# Patient Record
Sex: FEMALE | Race: WHITE | NOT HISPANIC OR LATINO
[De-identification: names, ages, dates, MRNs, and addresses within clinical notes are randomized per-mention and may not be internally consistent; named-entity substitution may affect disease eponyms.]

---

## 2021-10-18 PROBLEM — Z00.00 ENCOUNTER FOR PREVENTIVE HEALTH EXAMINATION: Status: ACTIVE | Noted: 2021-10-18

## 2021-11-18 ENCOUNTER — ASOB RESULT (OUTPATIENT)
Age: 26
End: 2021-11-18

## 2021-11-18 ENCOUNTER — APPOINTMENT (OUTPATIENT)
Dept: ANTEPARTUM | Facility: CLINIC | Age: 26
End: 2021-11-18
Payer: COMMERCIAL

## 2021-11-18 PROCEDURE — 99072 ADDL SUPL MATRL&STAF TM PHE: CPT

## 2021-11-18 PROCEDURE — 76813 OB US NUCHAL MEAS 1 GEST: CPT | Mod: 59

## 2021-11-18 PROCEDURE — 76801 OB US < 14 WKS SINGLE FETUS: CPT

## 2022-01-24 ENCOUNTER — APPOINTMENT (OUTPATIENT)
Dept: ANTEPARTUM | Facility: CLINIC | Age: 27
End: 2022-01-24
Payer: COMMERCIAL

## 2022-01-24 ENCOUNTER — ASOB RESULT (OUTPATIENT)
Age: 27
End: 2022-01-24

## 2022-01-24 PROCEDURE — 76811 OB US DETAILED SNGL FETUS: CPT

## 2022-01-24 PROCEDURE — 99072 ADDL SUPL MATRL&STAF TM PHE: CPT

## 2022-06-09 VITALS
SYSTOLIC BLOOD PRESSURE: 129 MMHG | WEIGHT: 187.4 LBS | HEIGHT: 66 IN | DIASTOLIC BLOOD PRESSURE: 78 MMHG | BODY MASS INDEX: 30.12 KG/M2

## 2022-06-13 ENCOUNTER — INPATIENT (INPATIENT)
Facility: HOSPITAL | Age: 27
LOS: 2 days | Discharge: HOME | End: 2022-06-16
Attending: OBSTETRICS & GYNECOLOGY | Admitting: OBSTETRICS & GYNECOLOGY
Payer: COMMERCIAL

## 2022-06-13 VITALS
HEART RATE: 89 BPM | TEMPERATURE: 99 F | SYSTOLIC BLOOD PRESSURE: 129 MMHG | DIASTOLIC BLOOD PRESSURE: 77 MMHG | RESPIRATION RATE: 16 BRPM

## 2022-06-13 LAB
AMPHET UR-MCNC: NEGATIVE — SIGNIFICANT CHANGE UP
APPEARANCE UR: CLEAR — SIGNIFICANT CHANGE UP
BACTERIA # UR AUTO: NEGATIVE — SIGNIFICANT CHANGE UP
BARBITURATES UR SCN-MCNC: NEGATIVE — SIGNIFICANT CHANGE UP
BASOPHILS # BLD AUTO: 0.02 K/UL — SIGNIFICANT CHANGE UP (ref 0–0.2)
BASOPHILS NFR BLD AUTO: 0.2 % — SIGNIFICANT CHANGE UP (ref 0–1)
BENZODIAZ UR-MCNC: NEGATIVE — SIGNIFICANT CHANGE UP
BILIRUB UR-MCNC: NEGATIVE — SIGNIFICANT CHANGE UP
BLD GP AB SCN SERPL QL: SIGNIFICANT CHANGE UP
BUPRENORPHINE SCREEN, URINE RESULT: NEGATIVE — SIGNIFICANT CHANGE UP
COCAINE METAB.OTHER UR-MCNC: NEGATIVE — SIGNIFICANT CHANGE UP
COLOR SPEC: COLORLESS — SIGNIFICANT CHANGE UP
DIFF PNL FLD: ABNORMAL
EOSINOPHIL # BLD AUTO: 0.01 K/UL — SIGNIFICANT CHANGE UP (ref 0–0.7)
EOSINOPHIL NFR BLD AUTO: 0.1 % — SIGNIFICANT CHANGE UP (ref 0–8)
EPI CELLS # UR: 1 /HPF — SIGNIFICANT CHANGE UP (ref 0–5)
FENTANYL UR QL: NEGATIVE — SIGNIFICANT CHANGE UP
GLUCOSE UR QL: NEGATIVE — SIGNIFICANT CHANGE UP
HCT VFR BLD CALC: 37.4 % — SIGNIFICANT CHANGE UP (ref 37–47)
HGB BLD-MCNC: 12.6 G/DL — SIGNIFICANT CHANGE UP (ref 12–16)
HYALINE CASTS # UR AUTO: 0 /LPF — SIGNIFICANT CHANGE UP (ref 0–7)
IMM GRANULOCYTES NFR BLD AUTO: 0.8 % — HIGH (ref 0.1–0.3)
KETONES UR-MCNC: NEGATIVE — SIGNIFICANT CHANGE UP
L&D DRUG SCREEN, URINE: SIGNIFICANT CHANGE UP
LEUKOCYTE ESTERASE UR-ACNC: ABNORMAL
LYMPHOCYTES # BLD AUTO: 1.3 K/UL — SIGNIFICANT CHANGE UP (ref 1.2–3.4)
LYMPHOCYTES # BLD AUTO: 10 % — LOW (ref 20.5–51.1)
MCHC RBC-ENTMCNC: 30.9 PG — SIGNIFICANT CHANGE UP (ref 27–31)
MCHC RBC-ENTMCNC: 33.7 G/DL — SIGNIFICANT CHANGE UP (ref 32–37)
MCV RBC AUTO: 91.7 FL — SIGNIFICANT CHANGE UP (ref 81–99)
METHADONE UR-MCNC: NEGATIVE — SIGNIFICANT CHANGE UP
MONOCYTES # BLD AUTO: 0.5 K/UL — SIGNIFICANT CHANGE UP (ref 0.1–0.6)
MONOCYTES NFR BLD AUTO: 3.8 % — SIGNIFICANT CHANGE UP (ref 1.7–9.3)
NEUTROPHILS # BLD AUTO: 11.05 K/UL — HIGH (ref 1.4–6.5)
NEUTROPHILS NFR BLD AUTO: 85.1 % — HIGH (ref 42.2–75.2)
NITRITE UR-MCNC: NEGATIVE — SIGNIFICANT CHANGE UP
NRBC # BLD: 0 /100 WBCS — SIGNIFICANT CHANGE UP (ref 0–0)
OPIATES UR-MCNC: NEGATIVE — SIGNIFICANT CHANGE UP
OXYCODONE UR-MCNC: NEGATIVE — SIGNIFICANT CHANGE UP
PCP UR-MCNC: NEGATIVE — SIGNIFICANT CHANGE UP
PH UR: 7 — SIGNIFICANT CHANGE UP (ref 5–8)
PLATELET # BLD AUTO: 120 K/UL — LOW (ref 130–400)
PRENATAL SYPHILIS TEST: SIGNIFICANT CHANGE UP
PROPOXYPHENE QUALITATIVE URINE RESULT: NEGATIVE — SIGNIFICANT CHANGE UP
PROT UR-MCNC: NEGATIVE — SIGNIFICANT CHANGE UP
RBC # BLD: 4.08 M/UL — LOW (ref 4.2–5.4)
RBC # FLD: 13.1 % — SIGNIFICANT CHANGE UP (ref 11.5–14.5)
RBC CASTS # UR COMP ASSIST: 1 /HPF — SIGNIFICANT CHANGE UP (ref 0–4)
SARS-COV-2 RNA SPEC QL NAA+PROBE: SIGNIFICANT CHANGE UP
SP GR SPEC: 1 — LOW (ref 1.01–1.03)
UROBILINOGEN FLD QL: SIGNIFICANT CHANGE UP
WBC # BLD: 12.99 K/UL — HIGH (ref 4.8–10.8)
WBC # FLD AUTO: 12.99 K/UL — HIGH (ref 4.8–10.8)
WBC UR QL: 6 /HPF — HIGH (ref 0–5)

## 2022-06-13 PROCEDURE — 59400 OBSTETRICAL CARE: CPT

## 2022-06-13 RX ORDER — KETOROLAC TROMETHAMINE 30 MG/ML
30 SYRINGE (ML) INJECTION ONCE
Refills: 0 | Status: DISCONTINUED | OUTPATIENT
Start: 2022-06-13 | End: 2022-06-13

## 2022-06-13 RX ORDER — TETANUS TOXOID, REDUCED DIPHTHERIA TOXOID AND ACELLULAR PERTUSSIS VACCINE, ADSORBED 5; 2.5; 8; 8; 2.5 [IU]/.5ML; [IU]/.5ML; UG/.5ML; UG/.5ML; UG/.5ML
0.5 SUSPENSION INTRAMUSCULAR ONCE
Refills: 0 | Status: DISCONTINUED | OUTPATIENT
Start: 2022-06-13 | End: 2022-06-16

## 2022-06-13 RX ORDER — OXYCODONE HYDROCHLORIDE 5 MG/1
5 TABLET ORAL
Refills: 0 | Status: DISCONTINUED | OUTPATIENT
Start: 2022-06-13 | End: 2022-06-16

## 2022-06-13 RX ORDER — PRAMOXINE HYDROCHLORIDE 150 MG/15G
1 AEROSOL, FOAM RECTAL EVERY 4 HOURS
Refills: 0 | Status: DISCONTINUED | OUTPATIENT
Start: 2022-06-13 | End: 2022-06-16

## 2022-06-13 RX ORDER — OXYTOCIN 10 UNIT/ML
333.33 VIAL (ML) INJECTION
Qty: 20 | Refills: 0 | Status: DISCONTINUED | OUTPATIENT
Start: 2022-06-13 | End: 2022-06-16

## 2022-06-13 RX ORDER — SODIUM CHLORIDE 9 MG/ML
1000 INJECTION, SOLUTION INTRAVENOUS
Refills: 0 | Status: DISCONTINUED | OUTPATIENT
Start: 2022-06-13 | End: 2022-06-13

## 2022-06-13 RX ORDER — OXYTOCIN 10 UNIT/ML
333.33 VIAL (ML) INJECTION
Qty: 20 | Refills: 0 | Status: DISCONTINUED | OUTPATIENT
Start: 2022-06-13 | End: 2022-06-13

## 2022-06-13 RX ORDER — CITRIC ACID/SODIUM CITRATE 300-500 MG
15 SOLUTION, ORAL ORAL EVERY 6 HOURS
Refills: 0 | Status: DISCONTINUED | OUTPATIENT
Start: 2022-06-13 | End: 2022-06-13

## 2022-06-13 RX ORDER — SIMETHICONE 80 MG/1
80 TABLET, CHEWABLE ORAL EVERY 4 HOURS
Refills: 0 | Status: DISCONTINUED | OUTPATIENT
Start: 2022-06-13 | End: 2022-06-16

## 2022-06-13 RX ORDER — HYDROCORTISONE 1 %
1 OINTMENT (GRAM) TOPICAL EVERY 6 HOURS
Refills: 0 | Status: DISCONTINUED | OUTPATIENT
Start: 2022-06-13 | End: 2022-06-16

## 2022-06-13 RX ORDER — ACETAMINOPHEN 500 MG
975 TABLET ORAL
Refills: 0 | Status: DISCONTINUED | OUTPATIENT
Start: 2022-06-13 | End: 2022-06-16

## 2022-06-13 RX ORDER — LANOLIN
1 OINTMENT (GRAM) TOPICAL EVERY 6 HOURS
Refills: 0 | Status: DISCONTINUED | OUTPATIENT
Start: 2022-06-13 | End: 2022-06-16

## 2022-06-13 RX ORDER — DIPHENHYDRAMINE HCL 50 MG
25 CAPSULE ORAL EVERY 6 HOURS
Refills: 0 | Status: DISCONTINUED | OUTPATIENT
Start: 2022-06-13 | End: 2022-06-16

## 2022-06-13 RX ORDER — AER TRAVELER 0.5 G/1
1 SOLUTION RECTAL; TOPICAL EVERY 4 HOURS
Refills: 0 | Status: DISCONTINUED | OUTPATIENT
Start: 2022-06-13 | End: 2022-06-16

## 2022-06-13 RX ORDER — DIBUCAINE 1 %
1 OINTMENT (GRAM) RECTAL EVERY 6 HOURS
Refills: 0 | Status: DISCONTINUED | OUTPATIENT
Start: 2022-06-13 | End: 2022-06-16

## 2022-06-13 RX ORDER — OXYCODONE HYDROCHLORIDE 5 MG/1
5 TABLET ORAL ONCE
Refills: 0 | Status: DISCONTINUED | OUTPATIENT
Start: 2022-06-13 | End: 2022-06-16

## 2022-06-13 RX ORDER — MAGNESIUM HYDROXIDE 400 MG/1
30 TABLET, CHEWABLE ORAL
Refills: 0 | Status: DISCONTINUED | OUTPATIENT
Start: 2022-06-13 | End: 2022-06-16

## 2022-06-13 RX ORDER — SODIUM CHLORIDE 9 MG/ML
3 INJECTION INTRAMUSCULAR; INTRAVENOUS; SUBCUTANEOUS EVERY 8 HOURS
Refills: 0 | Status: DISCONTINUED | OUTPATIENT
Start: 2022-06-13 | End: 2022-06-16

## 2022-06-13 RX ORDER — IBUPROFEN 200 MG
600 TABLET ORAL EVERY 6 HOURS
Refills: 0 | Status: COMPLETED | OUTPATIENT
Start: 2022-06-13 | End: 2023-05-12

## 2022-06-13 RX ORDER — IBUPROFEN 200 MG
600 TABLET ORAL EVERY 6 HOURS
Refills: 0 | Status: DISCONTINUED | OUTPATIENT
Start: 2022-06-13 | End: 2022-06-16

## 2022-06-13 RX ORDER — BENZOCAINE 10 %
1 GEL (GRAM) MUCOUS MEMBRANE EVERY 6 HOURS
Refills: 0 | Status: DISCONTINUED | OUTPATIENT
Start: 2022-06-13 | End: 2022-06-16

## 2022-06-13 RX ADMIN — Medication 30 MILLIGRAM(S): at 16:30

## 2022-06-13 RX ADMIN — Medication 975 MILLIGRAM(S): at 21:01

## 2022-06-13 RX ADMIN — SODIUM CHLORIDE 125 MILLILITER(S): 9 INJECTION, SOLUTION INTRAVENOUS at 13:23

## 2022-06-13 RX ADMIN — Medication 975 MILLIGRAM(S): at 22:15

## 2022-06-13 RX ADMIN — Medication 1000 MILLIUNIT(S)/MIN: at 16:00

## 2022-06-13 RX ADMIN — SODIUM CHLORIDE 3 MILLILITER(S): 9 INJECTION INTRAMUSCULAR; INTRAVENOUS; SUBCUTANEOUS at 22:20

## 2022-06-13 NOTE — OB PROVIDER H&P - HISTORY OF PRESENT ILLNESS
26y  @ 41w2d by LMP, TOMMIE 22, presents for contractions since around 5:00am. Rh positive, GBS negative. Denies VB, LOF. +FM. No complications with this pregnancy. Last seen in the office four days ago, exam was 3-4 cm per pt.

## 2022-06-13 NOTE — OB RN PATIENT PROFILE - HAS THE PATIENT RECEIVED THE INFLUENZA VACCINE THIS SEASON?
EXAMINATION:
CHEST 1 VIEW
 
CLINICAL INFORMATION:
Altered mental status.
 
COMPARISON:
Same day chest radiograph obtained at 1506 hours.
 
TECHNIQUE:
An AP view of the chest was obtained at 1723 hours.
 
FINDINGS:
The cardiac silhouette is stable. The mediastinal and hilar contours are
unremarkable. There are neither pleural effusions nor pneumothoraces. There
are no consolidations. The osseous structures are unremarkable.
 
IMPRESSION:
No evidence for acute disease. yes...

## 2022-06-13 NOTE — OB RN DELIVERY SUMMARY - NS_SKINCOMMENTSA_OBGYN_ALL_OB_FT
baby required additional stimulation and bulb suctioning during maternal laceration repair, RN moved infant to warmer for ease of intervention, Peds called to bedside but baby improved and returned S2S by 1618

## 2022-06-13 NOTE — OB PROVIDER DELIVERY SUMMARY - NSSELHIDDEN_OBGYN_ALL_OB_FT
[NS_DeliveryAttending1_OBGYN_ALL_OB_FT:[email or web address]=],[NS_DeliveryAssist1_OBGYN_ALL_OB_FT:[email or web address]=],[NS_DeliveryRN_OBGYN_ALL_OB_FT:[email or web address]=]

## 2022-06-13 NOTE — OB PROVIDER DELIVERY SUMMARY - NSPROVIDERDELIVERYNOTE_OBGYN_ALL_OB_FT
Patient was fully dilated and pushing. Fetal head was LYNDSEY. The anterior and posterior shoulders delivered, followed by the remaining body atraumatically. Delayed cord clamping was performed, and then cord clamped and cut. Cord blood gases collected x2. The  was handed to the mother and RN. The placenta delivered intact with membranes. Pitocin was administered per unit protocol. Uterus massaged, fundus found to be firm. Cervix, vagina and perineum inspected, 2nd degree laceration was noted, repaired using 2-0 chromic in the usual fashion with good hemostasis.   Viable female infant delivered, weighing 3550g, with APGARs 9/9.   ml.    Dr. Orr present for the delivery and repair

## 2022-06-13 NOTE — OB PROVIDER LABOR PROGRESS NOTE - NS_SUBJECTIVE/OBJECTIVE_OBGYN_ALL_OB_FT
26 y.o.  at 41w2d, admitted in labor.   Patient evaluated at bedside.  Intermittent monitoring in progress per unit protocol, per pt request and per Dr. Orr.  Pt states she is coping well with contractions, using position changes and labor support .  Bedside US performed: cephalic presentation confirmed. Pt declined cervical exam.

## 2022-06-13 NOTE — OB PROVIDER LABOR PROGRESS NOTE - ASSESSMENT
26y  @ 41w2d, GBS negative, in labor.    -Continue intermittent monitoring per unit protocol., as appropriate.   -Continue labor support per patient's birth plan, including birth  and spouse at bedside.   -Anticipate     Dr. Bai PGY-4 and Dr. Orr aware.

## 2022-06-13 NOTE — OB PROVIDER H&P - PRO PRENATAL LABS ORI SOURCE HIV
hard copy, drawn during this pregnancy Posture, length, shape, position symmetric and appropriate for age/Movement patterns with normal strength and range of motion/Hips without evidence of dislocation on Olsen & Ortalani maneuvers and by gluteal fold patterns

## 2022-06-13 NOTE — OB RN DELIVERY SUMMARY - NSSELHIDDEN_OBGYN_ALL_OB_FT
[NS_DeliveryAttending1_OBGYN_ALL_OB_FT:[email or web address]=],[NS_DeliveryAssist1_OBGYN_ALL_OB_FT:[email or web address]=],[NS_DeliveryRN_OBGYN_ALL_OB_FT:[email or web address]=]

## 2022-06-13 NOTE — OB PROVIDER H&P - NSHPPHYSICALEXAM_GEN_ALL_CORE
T(C): 37 (06-13-22 @ 12:01), Max: 37 (06-13-22 @ 11:39)  HR: 85 (06-13-22 @ 12:04) (85 - 89)  BP: 125/74 (06-13-22 @ 12:04) (125/74 - 129/77)  RR: 16 (06-13-22 @ 12:01) (16 - 16)    General: alert, oriented, no acute distress  Abd: soft, gravid, nontender, S=D  Extremities: no sign of DVT on exam    EFM: 145 bpm, moderate variability, +accels (Cat 1)  TOCO: q 3-4 min    SVE: 6/80/-2 (at 11:50 am)

## 2022-06-13 NOTE — OB PROVIDER H&P - ASSESSMENT
26y  @ 41w2d, GBS negative, in labor.    Admit to L&D  Admission labs  IV hydration  Continuous EFM & TOCO monitoring  Clear Liquid diet  Pain Management PRN  offer MMR and varicella vaccine postpartum (mumps and varicella non-immune)    Dr. Bai PGY-4 and Dr. Orr aware.

## 2022-06-13 NOTE — OB PROVIDER DELIVERY SUMMARY - NSOBVTERISKREFER_OBGYN_ALL_OB
Javy Butler)  Surgery; Thoracic and Cardiac Surgery  [address] [place] [address]  Phone: [phone]  Fax: [phone]  Follow Up Time:     Jordana Palafox  BRAIN INJURY MEDICINE  [address] [place] [address]  Phone: [phone]  Fax: [phone]  Follow Up Time:     Laurent Morales)  Medicine  Nephrology  [address] [place] [address]  Phone: [phone]  Fax: [phone]  Follow Up Time:     Ramon Sharpe  INTERNAL MEDICINE  [address] [facility], [address] [address]  Phone: [phone]  Fax: [phone]  Follow Up Time:    Refer to the Assessment tab to view/cancel completed assessment. Javy Butler)  Surgery; Thoracic and Cardiac Surgery  [address] [place] [address]  Phone: [phone]  Fax: [phone]  Follow Up Time: 1-3 days    Jordana Palafox  BRAIN INJURY MEDICINE  [address] [place] [address]  Phone: [phone]  Fax: [phone]  Follow Up Time: 1 month    Laurent Morales)  Medicine  Nephrology  [address] [place] [address]  Phone: [phone]  Fax: [phone]  Follow Up Time: 1 week    Ramon Sharpe  INTERNAL MEDICINE  [address] [place] [address]  Phone: [phone]  Fax: [phone]  Follow Up Time: 1 week   Javy Butler)  Surgery; Thoracic and Cardiac Surgery  [address] [place] [address]  Phone: [phone]  Fax: [phone]  Follow Up Time: 1-3 days    Jordana Palafox  BRAIN INJURY MEDICINE  [address] [place] [address]  Phone: [phone]  Fax: [phone]  Follow Up Time: 1 month    Laurent Morales)  Medicine  Nephrology  [address] [place] [address]  Phone: [phone]  Fax: [phone]  Follow Up Time: 1 week    Ramon Sharpe  INTERNAL MEDICINE  [address] [address]  Phone: [phone]  Fax: [phone]  Follow Up Time: 1 week    Quinn Yo  [id number]  [address] [place] [address]  Phone: [phone]  Fax: [phone]  Follow Up Time: 1-3 days

## 2022-06-13 NOTE — OB RN PATIENT PROFILE - FALL HARM RISK - UNIVERSAL INTERVENTIONS
Bed in lowest position, wheels locked, appropriate side rails in place/Call bell, personal items and telephone in reach/Instruct patient to call for assistance before getting out of bed or chair/Non-slip footwear when patient is out of bed/[place] to call system/Physically safe environment - no spills, clutter or unnecessary equipment/Purposeful Proactive Rounding/Room/bathroom lighting operational, light cord in reach

## 2022-06-13 NOTE — OB PROVIDER H&P - NSHPLABSRESULTS_GEN_ALL_CORE
LABS  10/18/21  measles immune  mumps NON-IMMUNE  rubella IMMUNE  varicella NON-IMMUNE  lead <1  RPR nonreactive  O positive (antibody negative)  urine culture <10k cfu nl david  HIV nonreactive  HBsAg nonreactive    11/18/21  1st trimester screen WNL    2/22/22  GCT 69    5/19/22  RPR nonreactive  HIV nonreactive  GBS negative    US  11/18/21 @ 11w5d: SIUP, placenta posterior low-lyinh, NT wnl  1/24/22 @ 21w2d: anatomy WNL, placenta posterior (NO previa) LABS  10/18/21  measles immune  mumps NON-IMMUNE  rubella IMMUNE  varicella NON-IMMUNE  lead <1  RPR nonreactive  O positive (antibody negative)  urine culture <10k cfu nl david  HIV nonreactive  HBsAg nonreactive    11/18/21  1st trimester screen WNL    2/22/22  GCT 69    5/19/22  RPR nonreactive  HIV nonreactive  GBS negative    US  11/18/21 @ 11w5d: SIUP, placenta posterior low-lying, NT wnl  1/24/22 @ 21w2d: anatomy WNL, placenta posterior (NO previa)

## 2022-06-13 NOTE — OB RN TRIAGE NOTE - FALL HARM RISK - UNIVERSAL INTERVENTIONS
Bed in lowest position, wheels locked, appropriate side rails in place/Call bell, personal items and telephone in reach/Instruct patient to call for assistance before getting out of bed or chair/Non-slip footwear when patient is out of bed/[place] to call system/Physically safe environment - no spills, clutter or unnecessary equipment/Purposeful Proactive Rounding/Room/bathroom lighting operational, light cord in reach

## 2022-06-14 ENCOUNTER — TRANSCRIPTION ENCOUNTER (OUTPATIENT)
Age: 27
End: 2022-06-14

## 2022-06-14 LAB
BASOPHILS # BLD AUTO: 0.03 K/UL — SIGNIFICANT CHANGE UP (ref 0–0.2)
BASOPHILS NFR BLD AUTO: 0.2 % — SIGNIFICANT CHANGE UP (ref 0–1)
COVID-19 SPIKE DOMAIN AB INTERP: POSITIVE
COVID-19 SPIKE DOMAIN ANTIBODY RESULT: >250 U/ML — HIGH
EOSINOPHIL # BLD AUTO: 0.04 K/UL — SIGNIFICANT CHANGE UP (ref 0–0.7)
EOSINOPHIL NFR BLD AUTO: 0.3 % — SIGNIFICANT CHANGE UP (ref 0–8)
HCT VFR BLD CALC: 28.4 % — LOW (ref 37–47)
HGB BLD-MCNC: 9.7 G/DL — LOW (ref 12–16)
IMM GRANULOCYTES NFR BLD AUTO: 0.5 % — HIGH (ref 0.1–0.3)
LYMPHOCYTES # BLD AUTO: 18 % — LOW (ref 20.5–51.1)
LYMPHOCYTES # BLD AUTO: 2.34 K/UL — SIGNIFICANT CHANGE UP (ref 1.2–3.4)
MCHC RBC-ENTMCNC: 30.6 PG — SIGNIFICANT CHANGE UP (ref 27–31)
MCHC RBC-ENTMCNC: 34.2 G/DL — SIGNIFICANT CHANGE UP (ref 32–37)
MCV RBC AUTO: 89.6 FL — SIGNIFICANT CHANGE UP (ref 81–99)
MONOCYTES # BLD AUTO: 0.72 K/UL — HIGH (ref 0.1–0.6)
MONOCYTES NFR BLD AUTO: 5.5 % — SIGNIFICANT CHANGE UP (ref 1.7–9.3)
NEUTROPHILS # BLD AUTO: 9.81 K/UL — HIGH (ref 1.4–6.5)
NEUTROPHILS NFR BLD AUTO: 75.5 % — HIGH (ref 42.2–75.2)
NRBC # BLD: 0 /100 WBCS — SIGNIFICANT CHANGE UP (ref 0–0)
PLATELET # BLD AUTO: 112 K/UL — LOW (ref 130–400)
RBC # BLD: 3.17 M/UL — LOW (ref 4.2–5.4)
RBC # FLD: 13 % — SIGNIFICANT CHANGE UP (ref 11.5–14.5)
SARS-COV-2 IGG+IGM SERPL QL IA: >250 U/ML — HIGH
SARS-COV-2 IGG+IGM SERPL QL IA: POSITIVE
WBC # BLD: 13.01 K/UL — HIGH (ref 4.8–10.8)
WBC # FLD AUTO: 13.01 K/UL — HIGH (ref 4.8–10.8)

## 2022-06-14 RX ADMIN — SODIUM CHLORIDE 3 MILLILITER(S): 9 INJECTION INTRAMUSCULAR; INTRAVENOUS; SUBCUTANEOUS at 12:07

## 2022-06-14 RX ADMIN — Medication 600 MILLIGRAM(S): at 06:44

## 2022-06-14 RX ADMIN — Medication 600 MILLIGRAM(S): at 18:01

## 2022-06-14 RX ADMIN — SODIUM CHLORIDE 3 MILLILITER(S): 9 INJECTION INTRAMUSCULAR; INTRAVENOUS; SUBCUTANEOUS at 06:50

## 2022-06-14 RX ADMIN — Medication 975 MILLIGRAM(S): at 20:14

## 2022-06-14 RX ADMIN — Medication 975 MILLIGRAM(S): at 10:00

## 2022-06-14 RX ADMIN — Medication 975 MILLIGRAM(S): at 20:10

## 2022-06-14 RX ADMIN — Medication 600 MILLIGRAM(S): at 00:19

## 2022-06-14 RX ADMIN — Medication 1 TABLET(S): at 12:07

## 2022-06-14 RX ADMIN — Medication 975 MILLIGRAM(S): at 16:00

## 2022-06-14 RX ADMIN — Medication 600 MILLIGRAM(S): at 23:33

## 2022-06-14 RX ADMIN — Medication 600 MILLIGRAM(S): at 12:06

## 2022-06-14 RX ADMIN — Medication 600 MILLIGRAM(S): at 12:53

## 2022-06-14 RX ADMIN — Medication 600 MILLIGRAM(S): at 19:02

## 2022-06-14 RX ADMIN — Medication 975 MILLIGRAM(S): at 08:57

## 2022-06-14 RX ADMIN — Medication 975 MILLIGRAM(S): at 15:00

## 2022-06-14 RX ADMIN — Medication 0.5 MILLILITER(S): at 17:50

## 2022-06-14 RX ADMIN — Medication 600 MILLIGRAM(S): at 23:26

## 2022-06-14 NOTE — DISCHARGE NOTE OB - MEDICATION SUMMARY - MEDICATIONS TO TAKE
I will START or STAY ON the medications listed below when I get home from the hospital:  None I will START or STAY ON the medications listed below when I get home from the hospital:    acetaminophen 325 mg oral tablet  -- 3 tab(s) by mouth every 6 hours, As Needed  -- Indication: For pain    ibuprofen 600 mg oral tablet  -- 1 tab(s) by mouth every 6 hours, As Needed  -- Indication: For pain

## 2022-06-14 NOTE — DISCHARGE NOTE OB - CARE PROVIDER_API CALL
Zhou Orellana)  Obstetrics and Gynecology  [address] [place] [address]  Phone: [phone]  Fax: [phone]  Follow Up Time:

## 2022-06-14 NOTE — DISCHARGE NOTE OB - NS MD DC FALL RISK RISK
For information on Fall & Injury Prevention, visit: [email or web address] OR  [email or web address] OR  https://www.cdc.gov/steadi/patient.html

## 2022-06-14 NOTE — DISCHARGE NOTE OB - PATIENT PORTAL LINK FT
You can access the FollowMyHealth Patient Portal offered by [facility] by registering at the following website: [email or web address]. By joining [employer]’s FollowMyHealth portal, you will also be able to view your health information using other applications (apps) compatible with our system.

## 2022-06-14 NOTE — PROGRESS NOTE ADULT - SUBJECTIVE AND OBJECTIVE BOX
OB attending  PPD #1    Pt doing well, pain well controlled. No overnight events, no acute complaints.    Ambulating: Yes  Voiding: Yes  Flatus: Yes  Bowel movements: Yes   Breast or bottle feeding: Breastfeeding  Diet: Regular    PAST MEDICAL & SURGICAL HISTORY:  No pertinent past medical history      No significant past surgical history          Physical Exam  Vital Signs Last 24 Hrs  T(C): 36.1 (2022 07:46), Max: 37 (2022 11:39)  T(F): 97 (2022 07:46), Max: 98.6 (2022 11:39)  HR: 76 (2022 07:46) (65 - 96)  BP: 118/68 (2022 07:46) (100/63 - 129/77)  BP(mean): --  RR: 18 (2022 07:46) (16 - 18)  SpO2: --  Gen: AAOx3, NAD  Abd: Soft, nontender, nondistended, BS+  Fundus: Firm, below umbilicus  Lochia: normal  Ext: No calf tenderness, no swelling    Labs:                        9.7    13.01 )-----------( 112      ( 2022 05:40 )             28.4         A/P:  s/p , PPD #1, doing well  - continue current management

## 2022-06-15 RX ADMIN — Medication 975 MILLIGRAM(S): at 21:53

## 2022-06-15 RX ADMIN — Medication 600 MILLIGRAM(S): at 23:27

## 2022-06-15 RX ADMIN — Medication 600 MILLIGRAM(S): at 12:55

## 2022-06-15 RX ADMIN — Medication 975 MILLIGRAM(S): at 03:21

## 2022-06-15 RX ADMIN — Medication 1 TABLET(S): at 12:56

## 2022-06-15 RX ADMIN — Medication 600 MILLIGRAM(S): at 17:49

## 2022-06-15 RX ADMIN — Medication 975 MILLIGRAM(S): at 03:16

## 2022-06-15 RX ADMIN — SODIUM CHLORIDE 3 MILLILITER(S): 9 INJECTION INTRAMUSCULAR; INTRAVENOUS; SUBCUTANEOUS at 21:53

## 2022-06-15 RX ADMIN — Medication 600 MILLIGRAM(S): at 15:15

## 2022-06-15 RX ADMIN — Medication 975 MILLIGRAM(S): at 15:19

## 2022-06-15 RX ADMIN — Medication 975 MILLIGRAM(S): at 09:30

## 2022-06-15 RX ADMIN — Medication 975 MILLIGRAM(S): at 16:15

## 2022-06-15 RX ADMIN — Medication 600 MILLIGRAM(S): at 19:00

## 2022-06-15 RX ADMIN — Medication 975 MILLIGRAM(S): at 09:27

## 2022-06-15 NOTE — PROGRESS NOTE ADULT - SUBJECTIVE AND OBJECTIVE BOX
Subjective:   Patient doing well. No complaints. Minimal lochia. Pain controlled.    Objective:   T(F): 96.9 (06-15 @ 07:40), Max: 97.5 (14 @ 23:55)  HR: 75 (06-15 @ 07:40)  BP: 120/70 (06-15 @ 07:40) (102/53 - 120/70)  RR: 18 (06-15 @ 07:40)  SpO2: --  Gen: AAOx3, NAD  Abd: Soft, Nontender, Nondistended, Fundus firm below the umbilicus  Ext: no tender, mild edema  Min Lochia Rubra    Labs:                        9.7    13.01 )-----------( 112      ( 2022 05:40 )             28.4             Tolerating regular diet  Passed flatus, passed bowel movement  Breast/Bottle feeding    Assessment:   26y s/p , PPD#2, doing well    Plan:  -Routine postpartum care  -Encouraged ambulation and PO hydration  -Tolerating regular diet

## 2022-06-16 VITALS
TEMPERATURE: 98 F | DIASTOLIC BLOOD PRESSURE: 76 MMHG | RESPIRATION RATE: 18 BRPM | HEART RATE: 80 BPM | SYSTOLIC BLOOD PRESSURE: 117 MMHG

## 2022-06-16 RX ORDER — ACETAMINOPHEN 500 MG
3 TABLET ORAL
Qty: 0 | Refills: 0 | DISCHARGE
Start: 2022-06-16

## 2022-06-16 RX ORDER — IBUPROFEN 200 MG
1 TABLET ORAL
Qty: 0 | Refills: 0 | DISCHARGE
Start: 2022-06-16

## 2022-06-16 RX ADMIN — Medication 975 MILLIGRAM(S): at 10:12

## 2022-06-16 RX ADMIN — Medication 600 MILLIGRAM(S): at 06:58

## 2022-06-16 RX ADMIN — Medication 1 TABLET(S): at 12:06

## 2022-06-16 RX ADMIN — Medication 600 MILLIGRAM(S): at 12:06

## 2022-06-16 NOTE — PROGRESS NOTE ADULT - SUBJECTIVE AND OBJECTIVE BOX
OB attending  PPD #2    Pt doing well, pain well controlled. No overnight events, no acute complaints.    Ambulating: Yes  Voiding: Yes  Flatus: Yes  Bowel movements: Yes   Breast or bottle feeding: Breastfeeding  Diet: Regular    PAST MEDICAL & SURGICAL HISTORY:  No pertinent past medical history      No significant past surgical history          Physical Exam  Vital Signs Last 24 Hrs  T(C): 36.7 (2022 07:36), Max: 36.7 (15 Mor 2022 15:50)  T(F): 98 (2022 07:36), Max: 98.1 (15 Mor 2022 15:50)  HR: 80 (2022 07:36) (68 - 80)  BP: 117/76 (2022 07:36) (111/66 - 117/76)  BP(mean): --  RR: 18 (2022 07:36) (18 - 18)  SpO2: --  Gen: AAOx3, NAD  Abd: Soft, nontender, nondistended, BS+  Fundus: Firm, below umbilicus  Lochia: normal  Ext: No calf tenderness, no swelling    Labs:    Rh+    A/P: s/p , PPD #3, doing well  - continue current management  -d/c home with instructions  -f/u 4-6 wks for pp

## 2022-06-20 DIAGNOSIS — Z20.822 CONTACT WITH AND (SUSPECTED) EXPOSURE TO COVID-19: ICD-10-CM

## 2022-06-20 DIAGNOSIS — Z3A.41 41 WEEKS GESTATION OF PREGNANCY: ICD-10-CM

## 2022-06-20 DIAGNOSIS — Z88.0 ALLERGY STATUS TO PENICILLIN: ICD-10-CM

## 2022-07-19 ENCOUNTER — APPOINTMENT (OUTPATIENT)
Dept: OBGYN | Facility: CLINIC | Age: 27
End: 2022-07-19

## 2022-07-19 PROBLEM — Z78.9 OTHER SPECIFIED HEALTH STATUS: Chronic | Status: ACTIVE | Noted: 2022-06-13

## 2022-07-21 ENCOUNTER — NON-APPOINTMENT (OUTPATIENT)
Age: 27
End: 2022-07-21

## 2022-07-21 DIAGNOSIS — Z78.9 OTHER SPECIFIED HEALTH STATUS: ICD-10-CM

## 2022-07-21 DIAGNOSIS — Z82.49 FAMILY HISTORY OF ISCHEMIC HEART DISEASE AND OTHER DISEASES OF THE CIRCULATORY SYSTEM: ICD-10-CM

## 2022-07-21 DIAGNOSIS — Z83.438 FAMILY HISTORY OF OTHER DISORDER OF LIPOPROTEIN METABOLISM AND OTHER LIPIDEMIA: ICD-10-CM

## 2022-07-26 ENCOUNTER — APPOINTMENT (OUTPATIENT)
Dept: OBGYN | Facility: CLINIC | Age: 27
End: 2022-07-26

## 2022-07-26 VITALS
SYSTOLIC BLOOD PRESSURE: 121 MMHG | WEIGHT: 171 LBS | DIASTOLIC BLOOD PRESSURE: 81 MMHG | BODY MASS INDEX: 27.48 KG/M2 | HEIGHT: 66 IN

## 2022-07-26 PROCEDURE — 0503F POSTPARTUM CARE VISIT: CPT

## 2022-07-26 NOTE — HISTORY OF PRESENT ILLNESS
[Postpartum Follow Up] : postpartum follow up [Last Pap Date: ___] : Last Pap Date: [unfilled] [Delivery Date: ___] : on [unfilled] [] : delivered by vaginal delivery [Female] : Delivery History: baby girl [Wt. ___] : weighing [unfilled] [Breastfeeding] : currently nursing [Back to Normal] : is back to normal in size [Normal] : the vagina was normal [Examination Of The Breasts] : breasts are normal [Doing Well] : is doing well [No Sign of Infection] : is showing no signs of infection [None] : None [Breast Pain] : no breast pain [S/Sx PP Depression] : no signs/symptoms of postpartum depression [Heavy Bleeding] : no heavy bleeding [Irregular Bleeding] : no irregular bleeding [Leg Pain] : no leg pain [Chills] : no chills [Fatigue] : no fatigue [Dysuria] : no dysuria [Fever] : no fever [Headache] : no headache [Nausea] : no nausea [Vomiting] : no vomiting [Cervix Sample Taken] : cervical sample not taken for a Pap smear [de-identified] : Reports infant "clicks" frequently at the breast, nipple is shaped like a lipstick tube after nursing. [de-identified] : Declines birth control, will use condoms, for lactation consult

## 2022-07-27 LAB
C TRACH RRNA SPEC QL NAA+PROBE: NOT DETECTED
N GONORRHOEA RRNA SPEC QL NAA+PROBE: NOT DETECTED
SOURCE AMPLIFICATION: NORMAL

## 2025-01-20 NOTE — OB PROVIDER H&P - NSICDXPASTSURGICALHX_GEN_ALL_CORE_FT
PCP: José Luis Alonzo PA-C     Last appt:  4/10/2024      Future Appointments   Date Time Provider Department Center   4/15/2025  8:30 AM José Luis Alonzo PA-C [facility]          Requested Prescriptions     Pending Prescriptions Disp Refills    LEVOXYL 150 MCG tablet 90 tablet 1     Sig: Take 1 tablet by mouth daily       PAST SURGICAL HISTORY:  No significant past surgical history